# Patient Record
Sex: FEMALE | Race: WHITE | NOT HISPANIC OR LATINO | URBAN - METROPOLITAN AREA
[De-identification: names, ages, dates, MRNs, and addresses within clinical notes are randomized per-mention and may not be internally consistent; named-entity substitution may affect disease eponyms.]

---

## 2019-08-18 ENCOUNTER — EMERGENCY (EMERGENCY)
Facility: HOSPITAL | Age: 44
LOS: 1 days | Discharge: HOME | End: 2019-08-18
Attending: EMERGENCY MEDICINE | Admitting: EMERGENCY MEDICINE
Payer: COMMERCIAL

## 2019-08-18 VITALS
SYSTOLIC BLOOD PRESSURE: 134 MMHG | HEIGHT: 66 IN | WEIGHT: 293 LBS | RESPIRATION RATE: 16 BRPM | HEART RATE: 91 BPM | OXYGEN SATURATION: 98 % | TEMPERATURE: 99 F | DIASTOLIC BLOOD PRESSURE: 87 MMHG

## 2019-08-18 PROCEDURE — 90471 IMMUNIZATION ADMIN: CPT

## 2019-08-18 PROCEDURE — 99283 EMERGENCY DEPT VISIT LOW MDM: CPT | Mod: 25

## 2019-08-18 PROCEDURE — 90715 TDAP VACCINE 7 YRS/> IM: CPT

## 2019-08-18 PROCEDURE — 73660 X-RAY EXAM OF TOE(S): CPT | Mod: 26,LT

## 2019-08-18 PROCEDURE — 99283 EMERGENCY DEPT VISIT LOW MDM: CPT

## 2019-08-18 PROCEDURE — 73660 X-RAY EXAM OF TOE(S): CPT

## 2019-08-18 RX ORDER — ACETAMINOPHEN 500 MG
650 TABLET ORAL ONCE
Refills: 0 | Status: COMPLETED | OUTPATIENT
Start: 2019-08-18 | End: 2019-08-18

## 2019-08-18 RX ORDER — TETANUS TOXOID, REDUCED DIPHTHERIA TOXOID AND ACELLULAR PERTUSSIS VACCINE, ADSORBED 5; 2.5; 8; 8; 2.5 [IU]/.5ML; [IU]/.5ML; UG/.5ML; UG/.5ML; UG/.5ML
0.5 SUSPENSION INTRAMUSCULAR ONCE
Refills: 0 | Status: COMPLETED | OUTPATIENT
Start: 2019-08-18 | End: 2019-08-18

## 2019-08-18 RX ADMIN — Medication 650 MILLIGRAM(S): at 14:31

## 2019-08-18 RX ADMIN — TETANUS TOXOID, REDUCED DIPHTHERIA TOXOID AND ACELLULAR PERTUSSIS VACCINE, ADSORBED 0.5 MILLILITER(S): 5; 2.5; 8; 8; 2.5 SUSPENSION INTRAMUSCULAR at 15:31

## 2019-08-18 NOTE — ED ADULT NURSE NOTE - NSIMPLEMENTINTERV_GEN_ALL_ED
Implemented All Universal Safety Interventions:  [place] to call system. Call bell, personal items and telephone within reach. Instruct patient to call for assistance. Room bathroom lighting operational. Non-slip footwear when patient is off stretcher. Physically safe environment: no spills, clutter or unnecessary equipment. Stretcher in lowest position, wheels locked, appropriate side rails in place.

## 2019-08-18 NOTE — ED PROVIDER NOTE - NSFOLLOWUPINSTRUCTIONS_ED_ALL_ED_FT
Crush Injury of the Foot    When a crush injury of the foot occurs, many structures within the foot and ankle joint can be affected. This can result in a complicated injury that may involve:  One or more broken (fractured) bones.  Lacerations or abrasions of the skin. These increase your risk of infection.  Compressed or torn muscles.  Torn ligaments and tendons.  Broken blood vessels, causing bleeding within the tissues. This can lead to dangerously high pressure within the tissues (compartment syndrome).  Damage to nerves.  One or more toe amputations.    What are the causes?  This type of injury can happen when a great amount of force is suddenly applied to the foot. This might occur:  During a motor vehicle accident.  If the foot is pulled into a machine during industrial or agricultural work.  If a heavy load falls directly onto the foot.  What are the signs or symptoms?  Symptoms will vary depending on which structures in your foot have been injured. Symptoms may include:  Moderate or severe pain in the foot, ankle, or leg.  Bleeding at the site of injury.  Tingling, numbness, or loss of feeling (sensation) in part or all of your foot.  Loss of movement in part or all of your foot.    How is this diagnosed?  Your health care provider will examine you and ask questions about how your injury happened. The exam may include checking for sensation and blood flow into your foot. You may also have tests, including X-rays and procedures to check the pressure in your foot.    After initial treatment, additional studies may be done to further diagnose the extent of your injuries. These may include:  A nerve conduction study to determine how well the nerves are working in your leg and foot.  MRI to determine if other injuries occurred that do not usually show up on X-ray.  How is this treated?  Treatment for this condition depends on the severity of your crush injury. Treatment may include:  Thorough cleaning if you have an open wound. This may or may not require surgery.  Having a splint applied to your foot or leg.  Medicine to relieve pain.  Antibiotic medicine to prevent infection.  Stitches (sutures) to close open wounds.  One or more surgeries to address injuries to skin, bones, joints, tendons, ligaments, muscles, nerves, or blood vessels.  Follow these instructions at home:  If you have a splint:     Wear the splint as told by your health care provider. Remove it only as told by your health care provider.  Loosen the splint if your toes tingle, become numb, or turn cold and blue.  Do not let your splint get wet if it is not waterproof.  Keep the splint clean.  Wound care     Image   If you have any skin wounds that were covered with bandages (dressings), follow instructions from your health care provider about how to take care of your wound. Make sure you:  Wash your hands with soap and water before you change your dressing. If soap and water are not available, use hand .  Change your dressing as told by your health care provider.  Leave stitches (sutures), skin glue, or adhesive strips in place. These skin closures may need to stay in place for 2 weeks or longer. If adhesive strip edges start to loosen and curl up, you may trim the loose edges. Do not remove adhesive strips completely unless your health care provider tells you to do that.  If you have skin wounds, check them every day for signs of infection. Check for:  More redness, swelling, or pain.  More fluid or blood.  Warmth.  Pus or a bad smell.  Managing pain, stiffness, and swelling     If directed, put ice on the injured area.  Put ice in a plastic bag.  Place a towel between your skin and the bag.  Leave the ice on for 20 minutes, 2–3 times a day.  Raise (elevate) the injured area above the level of your heart while you are sitting or lying down.  Driving     Do not drive or operate heavy machinery while taking prescription pain medicine.  Ask your health care provider when it is safe to drive if you have a splint on your foot or leg.  Activity     Return to your normal activities as told by your health care provider. Ask your health care provider what activities are safe for you.    General instructions     Take over-the-counter and prescription medicines only as told by your health care provider.  Do not use any tobacco products, such as cigarettes, chewing tobacco, and e-cigarettes. Tobacco can delay bone healing. If you need help quitting, ask your health care provider.  Keep all follow-up visits as told by your health care provider. This is important. These include PT and OT visits.    Contact a health care provider if:  A wound that was sutured opens up.  You have more redness, swelling, or pain in your foot.  You have more fluid or blood coming from your foot.  Your foot feels warm to the touch.  You have pus or a bad smell coming from your foot.  You have a fever.  Get help right away if:  You suddenly develop severe pain in your foot.  You previously had sensation in your foot and you suddenly lose sensation.  Your symptoms had improved and they suddenly get worse.  Your foot or toes are turning pink or blue.  This information is not intended to replace advice given to you by your health care provider. Make sure you discuss any questions you have with your health care provider.    Document Released: 11/28/2016 Document Revised: 05/25/2017 Document Reviewed: 08/10/2016  [employer] Interactive Patient Education © 2019 [employer]

## 2019-08-18 NOTE — ED PROVIDER NOTE - PHYSICAL EXAMINATION
CONSTITUTIONAL: Well-appearing; well-nourished; in no apparent distress.   HEAD: Normocephalic; atraumatic.   EYES:  conjunctiva and sclera clear  ENT: normal nose; no rhinorrhea;  NECK: Supple; full ROM  RESPIRATORY: Breathing easily; no resp difficulty  EXT: No cyanosis or edema; 2nd left toe distal phalanx w/ partial distal avulsion of nail, no active bleeding, 2mm superficial laceration on either side of nail with dried blood, no subungual hematoma. no bony deformity or tenderness, no edema. cap refill <3s on distal skin of toe, sensation intact to light touch  SKIN: Normal for age and race; warm; dry; good turgor; no apparent lesions or rash.   NEURO: A & O x 3; face symmetric; grossly unremarkable.   PSYCHOLOGICAL: The patient’s mood and manner are appropriate.

## 2019-08-18 NOTE — ED PROVIDER NOTE - OBJECTIVE STATEMENT
44yo F denies PMH or meds, here with L 2nd toe pain after her  accidentally stepped on her toe while wearing shoes. felt her toenail lift up and had some bleeding so came in for evaluation. bleeding stopped on its own without intervention. took motrin around 10am with good relief of pain. denies numbness/weakness, able to ambulate without issue. no hx of immunocompromise. no other injuries. happened this am.

## 2019-08-18 NOTE — ED ADULT TRIAGE NOTE - CHIEF COMPLAINT QUOTE
Pt CO pain to Left 2nd toe today.  Pt states "It got banged and I want to make sure it doesn't get infected."  Last TDAP unknown.

## 2019-08-18 NOTE — ED PROVIDER NOTE - CLINICAL SUMMARY MEDICAL DECISION MAKING FREE TEXT BOX
here w/ crush injury of toe w/ partial nail avulsion. avulsed nail trimmed and dressed w/ gauze for protection. slight laceration but very superficial, does not probe deeply, no subungual hematoma seen. no concern for need for abx at this time. xray neg on my wet read. DC home in NAD with strict return precautions given.

## 2019-08-18 NOTE — ED ADULT NURSE NOTE - OBJECTIVE STATEMENT
states she stubbed her 2nd left toe and is worried she might have an infection. no redness or bleeding noted. denies any fever/chills, numbness/tingling to left lower extremity.

## 2019-08-18 NOTE — ED PROVIDER NOTE - CARE PLAN
Principal Discharge DX:	Crush injury of toe, left, initial encounter  Secondary Diagnosis:	Nail avulsion, toe, initial encounter

## 2019-08-18 NOTE — ED PROVIDER NOTE - IMAGING STUDIES ADDITIONAL INFORMATION FREE TEXT
ER Physician: Yvonne Law  INTERPRETATION:  no acute fracture; no soft tissue swelling noted; normal bony alignment.
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
## 2019-08-24 DIAGNOSIS — S97.122A: ICD-10-CM
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
## 2019-08-24 DIAGNOSIS — S91.105A UNSPECIFIED OPEN WOUND OF LEFT LESSER TOE(S) WITHOUT DAMAGE TO NAIL, INITIAL ENCOUNTER: ICD-10-CM

## 2019-08-24 DIAGNOSIS — Z23 ENCOUNTER FOR IMMUNIZATION: ICD-10-CM

## 2019-08-24 DIAGNOSIS — M79.675 PAIN IN LEFT TOE(S): ICD-10-CM
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
## 2020-07-24 PROBLEM — Z78.9 OTHER SPECIFIED HEALTH STATUS: Chronic | Status: ACTIVE | Noted: 2019-08-18

## 2020-08-06 ENCOUNTER — APPOINTMENT (OUTPATIENT)
Dept: OBGYN | Facility: CLINIC | Age: 45
End: 2020-08-06
Payer: COMMERCIAL

## 2020-08-06 VITALS
HEIGHT: 66 IN | BODY MASS INDEX: 26.03 KG/M2 | WEIGHT: 162 LBS | TEMPERATURE: 97.1 F | SYSTOLIC BLOOD PRESSURE: 120 MMHG | DIASTOLIC BLOOD PRESSURE: 70 MMHG

## 2020-08-06 DIAGNOSIS — Z80.8 FAMILY HISTORY OF MALIGNANT NEOPLASM OF OTHER ORGANS OR SYSTEMS: ICD-10-CM

## 2020-08-06 DIAGNOSIS — Z01.419 ENCOUNTER FOR GYNECOLOGICAL EXAMINATION (GENERAL) (ROUTINE) W/OUT ABNORMAL FINDINGS: ICD-10-CM

## 2020-08-06 DIAGNOSIS — D21.9 BENIGN NEOPLASM OF CONNECTIVE AND OTHER SOFT TISSUE, UNSPECIFIED: ICD-10-CM

## 2020-08-06 PROCEDURE — 99202 OFFICE O/P NEW SF 15 MIN: CPT

## 2020-08-06 NOTE — COUNSELING
[Breast Self Exam] : breast self exam [Nutrition] : nutrition [Vitamins/Supplements] : vitamins/supplements [Exercise] : exercise [Vaccines] : vaccines [Medication Management] : medication management [STD (testing, results, tx)] : STD (testing, results, tx)

## 2020-08-06 NOTE — CHIEF COMPLAINT
[Annual Visit] : annual visit [FreeTextEntry1] : 44yo +0\par Annual visit\par Known  fibroid uterus\par \par Regular cycle\par Bulk symptoms\par no abnormal uterine bleeding.\par no treatment so far.\par \par PAP- 2019 wnl\par Mammography- wnl 2020\par \par \par \par  \par \par

## 2020-08-24 ENCOUNTER — TRANSCRIPTION ENCOUNTER (OUTPATIENT)
Age: 45
End: 2020-08-24

## 2020-08-24 ENCOUNTER — APPOINTMENT (OUTPATIENT)
Dept: ANTEPARTUM | Facility: CLINIC | Age: 45
End: 2020-08-24
Payer: COMMERCIAL

## 2020-08-24 PROCEDURE — 76830 TRANSVAGINAL US NON-OB: CPT

## 2021-06-24 NOTE — ED PROVIDER NOTE - NSCAREINITIATED _GEN_ER
Anticipated Discharge Disposition: SNF    Action: met with pt to discuss SNF. Pt  Gave verbal consent for referral to [facility] and [facility]. (Insurance is [employer].)    Barriers to Discharge: Pending SNF accept    Plan: F/U on SNF referrals.   Yvonne Law(Attending)

## 2023-09-21 NOTE — ED PROVIDER NOTE - PSH
All discharge instructions reviewed, questions answered, paper signed and given copy. Patient discharged per wheelchair with wife and belongings. No significant past surgical history